# Patient Record
Sex: FEMALE | Race: WHITE | NOT HISPANIC OR LATINO | Employment: FULL TIME | URBAN - METROPOLITAN AREA
[De-identification: names, ages, dates, MRNs, and addresses within clinical notes are randomized per-mention and may not be internally consistent; named-entity substitution may affect disease eponyms.]

---

## 2024-11-06 ENCOUNTER — HOSPITAL ENCOUNTER (OUTPATIENT)
Dept: LAB | Facility: MEDICAL CENTER | Age: 44
End: 2024-11-06
Payer: COMMERCIAL

## 2024-11-06 DIAGNOSIS — E55.9 VITAMIN D DEFICIENCY: ICD-10-CM

## 2024-11-06 DIAGNOSIS — E78.2 MIXED HYPERLIPIDEMIA: ICD-10-CM

## 2024-11-06 DIAGNOSIS — R73.03 PREDIABETES: ICD-10-CM

## 2024-11-06 DIAGNOSIS — D50.0 IRON DEFICIENCY ANEMIA DUE TO CHRONIC BLOOD LOSS: ICD-10-CM

## 2024-11-06 LAB
25(OH)D3 SERPL-MCNC: 26 NG/ML (ref 30–100)
ALBUMIN SERPL BCP-MCNC: 4.5 G/DL (ref 3.2–4.9)
ALBUMIN/GLOB SERPL: 1.4 G/DL
ALP SERPL-CCNC: 71 U/L (ref 30–99)
ALT SERPL-CCNC: 10 U/L (ref 2–50)
ANION GAP SERPL CALC-SCNC: 12 MMOL/L (ref 7–16)
AST SERPL-CCNC: 22 U/L (ref 12–45)
BASOPHILS # BLD AUTO: 1.3 % (ref 0–1.8)
BASOPHILS # BLD: 0.09 K/UL (ref 0–0.12)
BILIRUB SERPL-MCNC: 0.4 MG/DL (ref 0.1–1.5)
BUN SERPL-MCNC: 16 MG/DL (ref 8–22)
CALCIUM ALBUM COR SERPL-MCNC: 10 MG/DL (ref 8.5–10.5)
CALCIUM SERPL-MCNC: 10.4 MG/DL (ref 8.5–10.5)
CHLORIDE SERPL-SCNC: 102 MMOL/L (ref 96–112)
CHOLEST SERPL-MCNC: 202 MG/DL (ref 100–199)
CO2 SERPL-SCNC: 24 MMOL/L (ref 20–33)
CREAT SERPL-MCNC: 0.67 MG/DL (ref 0.5–1.4)
EOSINOPHIL # BLD AUTO: 0.32 K/UL (ref 0–0.51)
EOSINOPHIL NFR BLD: 4.5 % (ref 0–6.9)
ERYTHROCYTE [DISTWIDTH] IN BLOOD BY AUTOMATED COUNT: 40.5 FL (ref 35.9–50)
EST. AVERAGE GLUCOSE BLD GHB EST-MCNC: 131 MG/DL
FASTING STATUS PATIENT QL REPORTED: NORMAL
FERRITIN SERPL-MCNC: 104 NG/ML (ref 10–291)
GFR SERPLBLD CREATININE-BSD FMLA CKD-EPI: 110 ML/MIN/1.73 M 2
GLOBULIN SER CALC-MCNC: 3.3 G/DL (ref 1.9–3.5)
GLUCOSE SERPL-MCNC: 95 MG/DL (ref 65–99)
HBA1C MFR BLD: 6.2 % (ref 4–5.6)
HCT VFR BLD AUTO: 43 % (ref 37–47)
HDLC SERPL-MCNC: 40 MG/DL
HGB BLD-MCNC: 14 G/DL (ref 12–16)
IMM GRANULOCYTES # BLD AUTO: 0.02 K/UL (ref 0–0.11)
IMM GRANULOCYTES NFR BLD AUTO: 0.3 % (ref 0–0.9)
IRON SATN MFR SERPL: 40 % (ref 15–55)
IRON SERPL-MCNC: 115 UG/DL (ref 40–170)
LDLC SERPL CALC-MCNC: 133 MG/DL
LYMPHOCYTES # BLD AUTO: 1.96 K/UL (ref 1–4.8)
LYMPHOCYTES NFR BLD: 27.6 % (ref 22–41)
MCH RBC QN AUTO: 29.9 PG (ref 27–33)
MCHC RBC AUTO-ENTMCNC: 32.6 G/DL (ref 32.2–35.5)
MCV RBC AUTO: 91.9 FL (ref 81.4–97.8)
MONOCYTES # BLD AUTO: 0.59 K/UL (ref 0–0.85)
MONOCYTES NFR BLD AUTO: 8.3 % (ref 0–13.4)
NEUTROPHILS # BLD AUTO: 4.11 K/UL (ref 1.82–7.42)
NEUTROPHILS NFR BLD: 58 % (ref 44–72)
NRBC # BLD AUTO: 0 K/UL
NRBC BLD-RTO: 0 /100 WBC (ref 0–0.2)
PLATELET # BLD AUTO: 324 K/UL (ref 164–446)
PMV BLD AUTO: 10 FL (ref 9–12.9)
POTASSIUM SERPL-SCNC: 4.5 MMOL/L (ref 3.6–5.5)
PROT SERPL-MCNC: 7.8 G/DL (ref 6–8.2)
RBC # BLD AUTO: 4.68 M/UL (ref 4.2–5.4)
SODIUM SERPL-SCNC: 138 MMOL/L (ref 135–145)
TIBC SERPL-MCNC: 291 UG/DL (ref 250–450)
TRIGL SERPL-MCNC: 143 MG/DL (ref 0–149)
TSH SERPL DL<=0.005 MIU/L-ACNC: 1.17 UIU/ML (ref 0.38–5.33)
UIBC SERPL-MCNC: 176 UG/DL (ref 110–370)
WBC # BLD AUTO: 7.1 K/UL (ref 4.8–10.8)

## 2024-11-06 PROCEDURE — 83036 HEMOGLOBIN GLYCOSYLATED A1C: CPT

## 2024-11-06 PROCEDURE — 83550 IRON BINDING TEST: CPT

## 2024-11-06 PROCEDURE — 83540 ASSAY OF IRON: CPT

## 2024-11-06 PROCEDURE — 85025 COMPLETE CBC W/AUTO DIFF WBC: CPT

## 2024-11-06 PROCEDURE — 82306 VITAMIN D 25 HYDROXY: CPT

## 2024-11-06 PROCEDURE — 82728 ASSAY OF FERRITIN: CPT

## 2024-11-06 PROCEDURE — 84443 ASSAY THYROID STIM HORMONE: CPT

## 2024-11-06 PROCEDURE — 36415 COLL VENOUS BLD VENIPUNCTURE: CPT

## 2024-11-06 PROCEDURE — 80053 COMPREHEN METABOLIC PANEL: CPT

## 2024-11-06 PROCEDURE — 80061 LIPID PANEL: CPT

## 2024-11-18 DIAGNOSIS — F41.9 ANXIETY: ICD-10-CM

## 2024-11-18 NOTE — TELEPHONE ENCOUNTER
Received request via: Pharmacy    Was the patient seen in the last year in this department? Yes    Does the patient have an active prescription (recently filled or refills available) for medication(s) requested? No    Pharmacy Name: [facility]    Does the patient have group home Plus and need 100-day supply? (This applies to ALL medications) Patient does not have SCP

## 2024-11-21 RX ORDER — HYDROXYZINE PAMOATE 25 MG/1
25 CAPSULE ORAL 3 TIMES DAILY PRN
Qty: 180 CAPSULE | Refills: 0 | Status: SHIPPED | OUTPATIENT
Start: 2024-11-21

## 2024-11-25 DIAGNOSIS — R10.12 LUQ ABDOMINAL PAIN: ICD-10-CM

## 2024-11-25 RX ORDER — PANTOPRAZOLE SODIUM 40 MG/1
TABLET, DELAYED RELEASE ORAL
Qty: 180 TABLET | Refills: 0 | Status: SHIPPED | OUTPATIENT
Start: 2024-11-25

## 2024-11-25 NOTE — TELEPHONE ENCOUNTER
Received request via: Pharmacy    Was the patient seen in the last year in this department? Yes    Does the patient have an active prescription (recently filled or refills available) for medication(s) requested? No    Pharmacy Name: [facility] - [place], [place] - [address]     Does the patient have care home Plus and need 100-day supply? (This applies to ALL medications) Patient does not have SCP

## 2024-11-27 DIAGNOSIS — J45.40 MODERATE PERSISTENT ASTHMA WITHOUT COMPLICATION: ICD-10-CM

## 2024-11-28 NOTE — TELEPHONE ENCOUNTER
Received request via: Pharmacy    Was the patient seen in the last year in this department? Yes    Does the patient have an active prescription (recently filled or refills available) for medication(s) requested? No    Pharmacy Name: [facility]    Does the patient have [facility] Plus and need 100-day supply? (This applies to ALL medications) Patient does not have SCP

## 2024-12-02 RX ORDER — ALBUTEROL SULFATE 90 UG/1
2 INHALANT RESPIRATORY (INHALATION) EVERY 6 HOURS PRN
Qty: 8.5 G | Refills: 0 | Status: SHIPPED | OUTPATIENT
Start: 2024-12-02

## 2025-01-29 DIAGNOSIS — F41.9 ANXIETY: ICD-10-CM

## 2025-01-29 RX ORDER — HYDROXYZINE PAMOATE 25 MG/1
CAPSULE ORAL
Qty: 180 CAPSULE | Refills: 0 | Status: SHIPPED | OUTPATIENT
Start: 2025-01-29

## 2025-02-25 ENCOUNTER — OFFICE VISIT (OUTPATIENT)
Dept: MEDICAL GROUP | Facility: PHYSICIAN GROUP | Age: 45
End: 2025-02-25
Payer: COMMERCIAL

## 2025-02-25 VITALS
DIASTOLIC BLOOD PRESSURE: 86 MMHG | HEART RATE: 88 BPM | WEIGHT: 197.5 LBS | TEMPERATURE: 97.9 F | BODY MASS INDEX: 33.72 KG/M2 | OXYGEN SATURATION: 98 % | HEIGHT: 64 IN | SYSTOLIC BLOOD PRESSURE: 132 MMHG

## 2025-02-25 DIAGNOSIS — R09.81 SINUS CONGESTION: ICD-10-CM

## 2025-02-25 DIAGNOSIS — R92.30 DENSE BREAST TISSUE: ICD-10-CM

## 2025-02-25 DIAGNOSIS — Z12.31 ENCOUNTER FOR SCREENING MAMMOGRAM FOR BREAST CANCER: ICD-10-CM

## 2025-02-25 DIAGNOSIS — H92.03 OTALGIA OF BOTH EARS: ICD-10-CM

## 2025-02-25 PROCEDURE — 99213 OFFICE O/P EST LOW 20 MIN: CPT | Performed by: NURSE PRACTITIONER

## 2025-02-25 PROCEDURE — 3075F SYST BP GE 130 - 139MM HG: CPT | Performed by: NURSE PRACTITIONER

## 2025-02-25 PROCEDURE — 3079F DIAST BP 80-89 MM HG: CPT | Performed by: NURSE PRACTITIONER

## 2025-02-25 ASSESSMENT — ENCOUNTER SYMPTOMS
SORE THROAT: 1
FEVER: 0
RHINORRHEA: 1
FATIGUE: 1
HEADACHES: 1
EYE REDNESS: 1

## 2025-02-25 ASSESSMENT — PATIENT HEALTH QUESTIONNAIRE - PHQ9: CLINICAL INTERPRETATION OF PHQ2 SCORE: 0

## 2025-02-25 ASSESSMENT — FIBROSIS 4 INDEX: FIB4 SCORE: 0.97

## 2025-02-25 NOTE — LETTER
February 25, 2025         Patient: Edwige Soliz   YOB: 1980   Date of Visit: 2/25/2025           To Whom it May Concern:    Edwige Soliz was seen in my clinic on 2/25/2025. Please excuse missed work from Friday 2/21/2025 - Wednesday 2/26/2025. She may return to work on Thursday 2/27/2025.    If you have any questions or concerns, please don't hesitate to call.        Sincerely,           KEITH Cardoso  Electronically Signed

## 2025-02-25 NOTE — PROGRESS NOTES
"Subjective     Edwige Soliz is a 44 y.o. female who presents with Pharyngitis (X5 days/Sinus problem ) and Letter for School/Work            Pharyngitis     ROS           Objective     /86 (BP Location: Left arm, Patient Position: Sitting, BP Cuff Size: Adult)   Pulse 88   Temp 36.6 °C (97.9 °F) (Temporal)   Ht 1.626 m (5' 4\")   Wt 89.6 kg (197 lb 8 oz)   SpO2 98%   BMI 33.90 kg/m²      Physical Exam                             Assessment & Plan                 "

## 2025-02-25 NOTE — PROGRESS NOTES
"Verbal consent was acquired by the patient to use [employer] ambient listening note generation during this visit Yes      Subjective   Edwige Soliz is a 44 y.o. female who presents for:  History of Present Illness  The patient presents for evaluation of congestion, ear pain, sore throat, and headache.    She reports experiencing severe head congestion, describing it as feeling like her head is three times its normal size. She also notes a sensation of clogged ears, intermittent sore throat, runny nose that started yesterday, burning eyes, cough, and extreme fatigue. Despite these symptoms, she attempted to return to work on Monday but was only able to complete half a day before needing to leave. She has been using Afrin twice daily since Sunday, marking today as her third day of use. She has not experienced any fever. She is seeking a note for work due to her illness. She received an influenza vaccine in October 2024. Additionally, she reports significant pain in her right ear.    She is due for her mammogram and colon cancer screening. She had a colonoscopy last year. Her mother and sister both have breast cancer, but it was never detected in the mammogram. It was detected in the ultrasound because they all have dense tissue.    FAMILY HISTORY  Her mother and sister both have breast cancer. Her grandfather had colon cancer.    MEDICATIONS  Afrin    IMMUNIZATIONS  She received the influenza vaccine in October 2024.    Review of Systems   Constitutional:  Positive for fatigue. Negative for fever.   HENT:  Positive for congestion, ear pain, rhinorrhea and sore throat.    Eyes:  Positive for redness.   Neurological:  Positive for headaches.     Objective   /86 (BP Location: Left arm, Patient Position: Sitting, BP Cuff Size: Adult)   Pulse 88   Temp 36.6 °C (97.9 °F) (Temporal)   Ht 1.626 m (5' 4\")   Wt 89.6 kg (197 lb 8 oz)   SpO2 98%   Physical Exam  General: Normal appearing. No distress.  HEENT: " Normocephalic. Eyes conjunctiva clear lids without ptosis, pupils equal and reactive to light accommodation, ears normal shape and contour, canals are clear bilaterally, tympanic membranes are benign, nasal mucosa benign, oropharynx is without erythema, edema or exudates. Sinuses (frontal and maxillary) nontender to palpation.  Pulmonary: Clear to ausculation.  Normal effort. No rales, rhonchi, or wheezing.  Cardiovascular: Regular rate and rhythm without murmur. Carotid and radial pulses are intact and equal bilaterally.  Neurologic: Grossly nonfocal.  Lymph: No cervical, supraclavicular or axillary lymph nodes are palpable.  Skin: Warm and dry.  No obvious lesions.  Musculoskeletal: Normal gait. No extremity cyanosis, clubbing, or edema.  Psych: Normal mood and affect. Alert and oriented x3. Judgment and insight is normal.     Assessment & Plan  1. Sinus congestion  2. Otalgia of both ears  New to examiner. She reports symptoms of congestion, ear pain, sore throat, headache, runny nose, burning eyes, cough, and fatigue. She reports significant pain in her right ear. Examination did not reveal any abnormalities. She has been using Afrin for 3 days. She is advised to switch to Flonase and use saline sprays or rinses to help with the pressure. She is also advised to drink plenty of fluids and get rest. A work note will be provided, excusing her from work from Friday, 02/21/2025, through Wednesday, 02/26/2025, with a return to work on Thursday, 02/27/2025.    3. Dense breast tissue  4. Encounter for screening mammogram for breast cancer  New to examiner. She is due for her mammogram and colon cancer screening. She had a colonoscopy last year, and it is not documented in the chart. An ultrasound will be ordered due to her family history of breast cancer in her mother and sister, which was detected via ultrasound due to dense breast tissue.  - US-SCREENING WHOLE BREAST (3D SCREENING); Future     Return if symptoms  worsen or fail to improve.     Please note that this dictation was created using voice recognition software. I have made every reasonable attempt to correct obvious errors, but I expect that there are errors of grammar and possibly content that I did not discover before finalizing the note.

## 2025-03-06 DIAGNOSIS — R10.12 LUQ ABDOMINAL PAIN: ICD-10-CM

## 2025-03-07 RX ORDER — PANTOPRAZOLE SODIUM 40 MG/1
40 TABLET, DELAYED RELEASE ORAL 2 TIMES DAILY
Qty: 180 TABLET | Refills: 0 | Status: SHIPPED | OUTPATIENT
Start: 2025-03-07

## 2025-03-07 NOTE — TELEPHONE ENCOUNTER
Received request via: Patient    Was the patient seen in the last year in this department? Yes    Does the patient have an active prescription (recently filled or refills available) for medication(s) requested? No    Pharmacy Name: [facility] - [place], [place] - [address]     Does the patient have [facility] Plus and need 100-day supply? (This applies to ALL medications) Patient does not have SCP

## 2025-05-16 ENCOUNTER — APPOINTMENT (OUTPATIENT)
Dept: MEDICAL GROUP | Facility: PHYSICIAN GROUP | Age: 45
End: 2025-05-16
Payer: COMMERCIAL

## 2025-06-04 DIAGNOSIS — R10.12 LUQ ABDOMINAL PAIN: ICD-10-CM

## 2025-06-04 NOTE — TELEPHONE ENCOUNTER
Received request via: Pharmacy    Was the patient seen in the last year in this department? Yes    Does the patient have an active prescription (recently filled or refills available) for medication(s) requested? No    Pharmacy Name:     [facility] - [place], [place] - [address] (Pharmacy) [phone]       Does the patient have long-term Plus and need 100-day supply? (This applies to ALL medications) Patient does not have SCP

## 2025-06-05 RX ORDER — PANTOPRAZOLE SODIUM 40 MG/1
40 TABLET, DELAYED RELEASE ORAL 2 TIMES DAILY
Qty: 180 TABLET | Refills: 0 | Status: SHIPPED | OUTPATIENT
Start: 2025-06-05

## 2025-07-24 ENCOUNTER — APPOINTMENT (OUTPATIENT)
Dept: MEDICAL GROUP | Facility: PHYSICIAN GROUP | Age: 45
End: 2025-07-24
Payer: COMMERCIAL

## 2025-07-24 VITALS
RESPIRATION RATE: 20 BRPM | HEIGHT: 64 IN | DIASTOLIC BLOOD PRESSURE: 74 MMHG | TEMPERATURE: 97.1 F | HEART RATE: 88 BPM | SYSTOLIC BLOOD PRESSURE: 118 MMHG | BODY MASS INDEX: 32.44 KG/M2 | WEIGHT: 190 LBS | OXYGEN SATURATION: 97 %

## 2025-07-24 DIAGNOSIS — Z71.3 WEIGHT LOSS COUNSELING, ENCOUNTER FOR: ICD-10-CM

## 2025-07-24 DIAGNOSIS — F41.9 ANXIETY: ICD-10-CM

## 2025-07-24 DIAGNOSIS — R73.03 PREDIABETES: ICD-10-CM

## 2025-07-24 DIAGNOSIS — E55.9 VITAMIN D DEFICIENCY: ICD-10-CM

## 2025-07-24 DIAGNOSIS — Z13.228 SCREENING FOR ENDOCRINE, METABOLIC AND IMMUNITY DISORDER: ICD-10-CM

## 2025-07-24 DIAGNOSIS — E78.2 MIXED HYPERLIPIDEMIA: ICD-10-CM

## 2025-07-24 DIAGNOSIS — Z13.0 SCREENING FOR ENDOCRINE, METABOLIC AND IMMUNITY DISORDER: ICD-10-CM

## 2025-07-24 DIAGNOSIS — Z13.29 SCREENING FOR ENDOCRINE, METABOLIC AND IMMUNITY DISORDER: ICD-10-CM

## 2025-07-24 DIAGNOSIS — F51.01 PRIMARY INSOMNIA: Primary | ICD-10-CM

## 2025-07-24 PROCEDURE — 99214 OFFICE O/P EST MOD 30 MIN: CPT

## 2025-07-24 PROCEDURE — 3074F SYST BP LT 130 MM HG: CPT

## 2025-07-24 PROCEDURE — 3078F DIAST BP <80 MM HG: CPT

## 2025-07-24 RX ORDER — TRAZODONE HYDROCHLORIDE 50 MG/1
50 TABLET ORAL NIGHTLY
Qty: 30 TABLET | Refills: 3 | Status: SHIPPED | OUTPATIENT
Start: 2025-07-24

## 2025-07-24 RX ORDER — HYDROXYZINE PAMOATE 25 MG/1
25 CAPSULE ORAL 3 TIMES DAILY PRN
Qty: 180 CAPSULE | Refills: 0 | Status: SHIPPED | OUTPATIENT
Start: 2025-07-24

## 2025-07-24 ASSESSMENT — FIBROSIS 4 INDEX: FIB4 SCORE: 0.99

## 2025-07-24 NOTE — PROGRESS NOTES
Subjective:     Chief Complaint   Patient presents with    Weight Gain     History of Present Illness  The patient is a 45-year-old female who presents for weight management and sleep issues.    She has been maintaining a healthy diet and exercising regularly, walking on a treadmill for 35 to 40 minutes, 3 to 4 days a week. Despite these efforts, she feels weak and has gained 7 pounds since her last visit in June 2024. Her diet includes two pieces of 70-calorie bread with half an avocado for breakfast, cucumbers and cream cheese or peppers and cream cheese for snacks, and chicken with sweet potatoes or steak with vegetables for lunch. She occasionally indulges in pizza on weekends but generally adheres to her meal plan. She has been prediabetic for several years and has attempted intermittent fasting, abstaining from food after 7 PM until 11:30 AM the next day.    She reports difficulty sleeping, low libido, mood swings, constant fatigue, and general discomfort. She is uncertain if these symptoms are due to hormonal changes or her prediabetic condition. She underwent an ablation procedure and has not menstruated in 14 months. Prior to the procedure, her menstrual cycle was irregular for three years, a change from her previously regular cycle. She suspects she may be in perimenopause. She also reports skin breakouts, itching, and tinnitus. She does not experience hot flashes. She describes her mood as fluctuating and unpredictable. She does not feel severely depressed or anxious but acknowledges the stress of daily life. She expresses concern about her memory, which she feels has deteriorated. She has not experienced shortness of breath, gasping for air, coughing, or headaches upon waking. She takes hydroxyzine 50 mg at night to aid sleep and manage itching.    She has difficulty falling asleep and often tosses and turns. Her , who has sleep apnea and uses a CPAP machine, has occasionally heard her snore, but  "it is not a regular occurrence. She finds coconut oil baths soothing.    Diet: She follows a clean diet with occasional indulgences on weekends.  Sleep: She reports difficulty falling asleep and poor sleep quality.    Allergies: Nickel and Penicillins  ROS per HPI  Health Maintenance: Completed     Objective:     /74 (BP Location: Left arm, Patient Position: Sitting, BP Cuff Size: Adult)   Pulse 88   Temp 36.2 °C (97.1 °F) (Temporal)   Resp 20   Ht 1.626 m (5' 4\")   Wt 86.2 kg (190 lb)   SpO2 97%   BMI 32.61 kg/m²  Body mass index is 32.61 kg/m².     Physical Exam  Vitals reviewed.   Constitutional:       General: She is not in acute distress.     Appearance: Normal appearance. She is not ill-appearing.   Cardiovascular:      Rate and Rhythm: Normal rate and regular rhythm.      Heart sounds: Normal heart sounds.   Pulmonary:      Effort: Pulmonary effort is normal. No respiratory distress.      Breath sounds: Normal breath sounds.   Abdominal:      General: Abdomen is flat. Bowel sounds are normal.      Palpations: Abdomen is soft.   Skin:     Coloration: Skin is not jaundiced or pale.   Neurological:      General: No focal deficit present.      Mental Status: She is alert and oriented to person, place, and time.   Psychiatric:         Mood and Affect: Mood normal.         Behavior: Behavior normal.         Thought Content: Thought content normal.         Judgment: Judgment normal.        Results  Labs   - A1c: 11/2024, 6.2%   - Vitamin D level: A little low   - Cholesterol: A little high    Results for orders placed or performed during the hospital encounter of 11/06/24   CBC WITH DIFFERENTIAL    Collection Time: 11/06/24  6:53 AM   Result Value Ref Range    WBC 7.1 4.8 - 10.8 K/uL    RBC 4.68 4.20 - 5.40 M/uL    Hemoglobin 14.0 12.0 - 16.0 g/dL    Hematocrit 43.0 37.0 - 47.0 %    MCV 91.9 81.4 - 97.8 fL    MCH 29.9 27.0 - 33.0 pg    MCHC 32.6 32.2 - 35.5 g/dL    RDW 40.5 35.9 - 50.0 fL    Platelet " Count 324 164 - 446 K/uL    MPV 10.0 9.0 - 12.9 fL    Neutrophils-Polys 58.00 44.00 - 72.00 %    Lymphocytes 27.60 22.00 - 41.00 %    Monocytes 8.30 0.00 - 13.40 %    Eosinophils 4.50 0.00 - 6.90 %    Basophils 1.30 0.00 - 1.80 %    Immature Granulocytes 0.30 0.00 - 0.90 %    Nucleated RBC 0.00 0.00 - 0.20 /100 WBC    Neutrophils (Absolute) 4.11 1.82 - 7.42 K/uL    Lymphs (Absolute) 1.96 1.00 - 4.80 K/uL    Monos (Absolute) 0.59 0.00 - 0.85 K/uL    Eos (Absolute) 0.32 0.00 - 0.51 K/uL    Baso (Absolute) 0.09 0.00 - 0.12 K/uL    Immature Granulocytes (abs) 0.02 0.00 - 0.11 K/uL    NRBC (Absolute) 0.00 K/uL   Comp Metabolic Panel    Collection Time: 11/06/24  6:53 AM   Result Value Ref Range    Sodium 138 135 - 145 mmol/L    Potassium 4.5 3.6 - 5.5 mmol/L    Chloride 102 96 - 112 mmol/L    Co2 24 20 - 33 mmol/L    Anion Gap 12.0 7.0 - 16.0    Glucose 95 65 - 99 mg/dL    Bun 16 8 - 22 mg/dL    Creatinine 0.67 0.50 - 1.40 mg/dL    Calcium 10.4 8.5 - 10.5 mg/dL    Correct Calcium 10.0 8.5 - 10.5 mg/dL    AST(SGOT) 22 12 - 45 U/L    ALT(SGPT) 10 2 - 50 U/L    Alkaline Phosphatase 71 30 - 99 U/L    Total Bilirubin 0.4 0.1 - 1.5 mg/dL    Albumin 4.5 3.2 - 4.9 g/dL    Total Protein 7.8 6.0 - 8.2 g/dL    Globulin 3.3 1.9 - 3.5 g/dL    A-G Ratio 1.4 g/dL   HEMOGLOBIN A1C    Collection Time: 11/06/24  6:53 AM   Result Value Ref Range    Glycohemoglobin 6.2 (H) 4.0 - 5.6 %    Est Avg Glucose 131 mg/dL   Lipid Profile    Collection Time: 11/06/24  6:53 AM   Result Value Ref Range    Cholesterol,Tot 202 (H) 100 - 199 mg/dL    Triglycerides 143 0 - 149 mg/dL    HDL 40 >=40 mg/dL     (H) <100 mg/dL   VITAMIN D,25 HYDROXY (DEFICIENCY)    Collection Time: 11/06/24  6:53 AM   Result Value Ref Range    25-Hydroxy   Vitamin D 25 26 (L) 30 - 100 ng/mL   TSH WITH REFLEX TO FT4    Collection Time: 11/06/24  6:53 AM   Result Value Ref Range    TSH 1.170 0.380 - 5.330 uIU/mL   IRON/TOTAL IRON BIND    Collection Time: 11/06/24  6:53 AM    Result Value Ref Range    Iron 115 40 - 170 ug/dL    Total Iron Binding 291 250 - 450 ug/dL    Unsat Iron Binding 176 110 - 370 ug/dL    % Saturation 40 15 - 55 %   FERRITIN    Collection Time: 11/06/24  6:53 AM   Result Value Ref Range    Ferritin 104.0 10.0 - 291.0 ng/mL   FASTING STATUS    Collection Time: 11/06/24  6:53 AM   Result Value Ref Range    Fasting Status Fasting    ESTIMATED GFR    Collection Time: 11/06/24  6:53 AM   Result Value Ref Range    GFR (CKD-EPI) 110 >60 mL/min/1.73 m 2      Assessment and Plan:     The following treatment plan was discussed through shared decision making with the patient:    1. Primary insomnia  traZODone (DESYREL) 50 MG Tab      2. Anxiety  hydrOXYzine pamoate (VISTARIL) 25 MG Cap      3. Mixed hyperlipidemia  Comp Metabolic Panel    Lipid Profile      4. Prediabetes  Comp Metabolic Panel    HEMOGLOBIN A1C      5. BMI 32.0-32.9,adult        6. Weight loss counseling, encounter for        7. Vitamin D deficiency  VITAMIN D,25 HYDROXY (DEFICIENCY)      8. Screening for endocrine, metabolic and immunity disorder  CBC WITH DIFFERENTIAL    Comp Metabolic Panel    HEMOGLOBIN A1C    TSH WITH REFLEX TO FT4        Assessment & Plan  1. Weight management.  - Her A1c level from 11/2024 was 6.2, indicating a prediabetic state nearing the diabetic range. Her vitamin D level is slightly below the desired range, and her cholesterol level is marginally elevated.  - She has gained approximately 7 pounds since her last visit a year ago.  - The [place] diet was suggested as a potential resource for managing perimenopausal and menopausal symptoms. A comprehensive set of baseline labs will be ordered to assess her current health status.  - She was advised to continue supplementing with vitamin D to support overall well-being, including bone density and immune function.    2. Sleep issues.  - Her sleep disturbances could be attributed to hormonal fluctuations or potential sleep apnea. She  is at low risk for sleep apnea based on her neck circumference measurement.  - A prescription for trazodone 50 mg was provided to aid in improving sleep quality. A refill of hydroxyzine 25 mg was also given.  - If trazodone proves effective, she may consider increasing the dose to 100 mg.  - If her sleep quality improves over the next month, she may consider starting phentermine for weight management.    3. Perimenopause.  - She reports symptoms consistent with perimenopause, including irritability, lack of sex drive, and irregular periods prior to her ablation.  - Hormone replacement therapy was discussed as a potential option.  - She was advised to follow up with her gynecologist to discuss hormone replacement therapy and further evaluation of her hormone levels.    Medication management  Refills provided of hydroxyzine necessary for anxiety and itching as this does significantly reduce the symptoms.    Return in about 3 months (around 10/24/2025) for Labs, Med Check.       Please note that this note was created using dictation with voice recognition software. I have made every reasonable attempt to correct obvious errors, but I expect that there are errors of grammar and possibly content that I did not discover before finalizing the note.    MURRAY Hunt  [facility]  [facility]